# Patient Record
Sex: FEMALE | URBAN - METROPOLITAN AREA
[De-identification: names, ages, dates, MRNs, and addresses within clinical notes are randomized per-mention and may not be internally consistent; named-entity substitution may affect disease eponyms.]

---

## 2017-05-11 ENCOUNTER — APPOINTMENT (RX ONLY)
Dept: URBAN - METROPOLITAN AREA CLINIC 121 | Facility: CLINIC | Age: 27
Setting detail: DERMATOLOGY
End: 2017-05-11

## 2017-05-11 DIAGNOSIS — L65.9 NONSCARRING HAIR LOSS, UNSPECIFIED: ICD-10-CM

## 2017-05-11 PROCEDURE — 99201: CPT

## 2017-05-11 PROCEDURE — ? ORDER TESTS

## 2017-05-11 PROCEDURE — ? COUNSELING

## 2017-05-11 ASSESSMENT — LOCATION DETAILED DESCRIPTION DERM: LOCATION DETAILED: LEFT MEDIAL FRONTAL SCALP

## 2017-05-11 ASSESSMENT — LOCATION ZONE DERM: LOCATION ZONE: SCALP

## 2017-05-11 ASSESSMENT — LOCATION SIMPLE DESCRIPTION DERM: LOCATION SIMPLE: LEFT SCALP

## 2017-05-11 NOTE — HPI: HAIR LOSS
How Did The Hair Loss Occur?: gradual in onset
How Severe Is Your Hair Loss?: moderate
What Hair Products Do You Use?: Living Proof

## 2023-02-24 ENCOUNTER — APPOINTMENT (RX ONLY)
Dept: URBAN - METROPOLITAN AREA CLINIC 88 | Facility: CLINIC | Age: 33
Setting detail: DERMATOLOGY
End: 2023-02-24

## 2023-09-21 ENCOUNTER — APPOINTMENT (RX ONLY)
Dept: URBAN - METROPOLITAN AREA CLINIC 88 | Facility: CLINIC | Age: 33
Setting detail: DERMATOLOGY
End: 2023-09-21